# Patient Record
Sex: FEMALE | Race: BLACK OR AFRICAN AMERICAN | NOT HISPANIC OR LATINO | URBAN - METROPOLITAN AREA
[De-identification: names, ages, dates, MRNs, and addresses within clinical notes are randomized per-mention and may not be internally consistent; named-entity substitution may affect disease eponyms.]

---

## 2023-09-05 ENCOUNTER — APPOINTMENT (OUTPATIENT)
Dept: FAMILY MEDICINE | Age: 28
End: 2023-09-05

## 2024-05-02 ENCOUNTER — APPOINTMENT (OUTPATIENT)
Dept: CT IMAGING | Facility: HOSPITAL | Age: 29
End: 2024-05-02
Attending: EMERGENCY MEDICINE
Payer: COMMERCIAL

## 2024-05-02 ENCOUNTER — HOSPITAL ENCOUNTER (EMERGENCY)
Facility: HOSPITAL | Age: 29
Discharge: HOME | End: 2024-05-02
Attending: EMERGENCY MEDICINE
Payer: COMMERCIAL

## 2024-05-02 VITALS
HEART RATE: 61 BPM | BODY MASS INDEX: 29.55 KG/M2 | HEIGHT: 68 IN | SYSTOLIC BLOOD PRESSURE: 128 MMHG | TEMPERATURE: 99 F | RESPIRATION RATE: 24 BRPM | OXYGEN SATURATION: 98 % | WEIGHT: 195 LBS | DIASTOLIC BLOOD PRESSURE: 92 MMHG

## 2024-05-02 DIAGNOSIS — S09.90XA INJURY OF HEAD, INITIAL ENCOUNTER: ICD-10-CM

## 2024-05-02 DIAGNOSIS — S16.1XXA STRAIN OF NECK MUSCLE, INITIAL ENCOUNTER: Primary | ICD-10-CM
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
## 2024-05-02 PROCEDURE — 99284 EMERGENCY DEPT VISIT MOD MDM: CPT

## 2024-05-02 PROCEDURE — 70450 CT HEAD/BRAIN W/O DYE: CPT | Performed by: EMERGENCY MEDICINE

## 2024-05-02 PROCEDURE — 72125 CT NECK SPINE W/O DYE: CPT | Performed by: EMERGENCY MEDICINE

## 2024-05-02 RX ORDER — HYDROCODONE BITARTRATE AND ACETAMINOPHEN 5; 325 MG/1; MG/1
1-2 TABLET ORAL EVERY 6 HOURS PRN
Qty: 10 TABLET | Refills: 0 | Status: SHIPPED | OUTPATIENT
Start: 2024-05-02 | End: 2024-05-07

## 2024-05-02 RX ORDER — HYDROCODONE BITARTRATE AND ACETAMINOPHEN 5; 325 MG/1; MG/1
1 TABLET ORAL ONCE
Status: COMPLETED | OUTPATIENT
Start: 2024-05-02 | End: 2024-05-02

## 2024-05-02 RX ORDER — IBUPROFEN 600 MG/1
600 TABLET ORAL ONCE
Status: COMPLETED | OUTPATIENT
Start: 2024-05-02 | End: 2024-05-02
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
## 2024-05-03 NOTE — ED QUICK NOTES
Introduced self to patient. Patient lying in position of comfort in bed, C-Collar in place, on monitors, blanket provided, given call light and instructed to call if anything was needed.

## 2024-05-03 NOTE — ED QUICK NOTES
MD cleared patient for discharge. Patient provided with discharge paperwork and RN discussed plan of care. Patient given opportunity to ask any questions. MD prescribed 1 medication. Patient was in no apparent distress. Patient instructed to follow up with PCP. Patient pushed out of ED in wheelchair by family.

## 2024-12-29 ENCOUNTER — WALK IN (OUTPATIENT)
Dept: URGENT CARE | Age: 29
End: 2024-12-29

## 2024-12-29 ENCOUNTER — HOSPITAL ENCOUNTER (EMERGENCY)
Age: 29
Discharge: HOME | End: 2024-12-29
Attending: EMERGENCY MEDICINE

## 2024-12-29 ENCOUNTER — APPOINTMENT (OUTPATIENT)
Age: 29
End: 2024-12-29
Attending: EMERGENCY MEDICINE

## 2024-12-29 VITALS
RESPIRATION RATE: 18 BRPM | TEMPERATURE: 97 F | SYSTOLIC BLOOD PRESSURE: 106 MMHG | DIASTOLIC BLOOD PRESSURE: 61 MMHG | OXYGEN SATURATION: 100 % | WEIGHT: 230 LBS | HEART RATE: 58 BPM

## 2024-12-29 DIAGNOSIS — O03.4 INCOMPLETE MISCARRIAGE (CMD): Primary | ICD-10-CM

## 2024-12-29 DIAGNOSIS — R10.9 ABDOMINAL PAIN, UNSPECIFIED ABDOMINAL LOCATION: Primary | ICD-10-CM

## 2024-12-29 LAB
ABO + RH BLD: NORMAL
ANION GAP SERPL CALC-SCNC: 16 MMOL/L (ref 7–19)
BASOPHILS # BLD: 0 K/MCL (ref 0–0.3)
BASOPHILS # BLD: 0 K/MCL (ref 0–0.3)
BASOPHILS NFR BLD: 1 %
BASOPHILS NFR BLD: 1 %
BUN SERPL-MCNC: 8 MG/DL (ref 6–20)
BUN/CREAT SERPL: 11 (ref 7–25)
CALCIUM SERPL-MCNC: 8.7 MG/DL (ref 8.4–10.2)
CHLORIDE SERPL-SCNC: 102 MMOL/L (ref 97–110)
CO2 SERPL-SCNC: 23 MMOL/L (ref 21–32)
CREAT SERPL-MCNC: 0.73 MG/DL (ref 0.51–0.95)
DEPRECATED RDW RBC: 52.7 FL (ref 39–50)
DEPRECATED RDW RBC: 53.1 FL (ref 39–50)
EGFRCR SERPLBLD CKD-EPI 2021: >90 ML/MIN/{1.73_M2}
EOSINOPHIL # BLD: 0.1 K/MCL (ref 0–0.5)
EOSINOPHIL # BLD: 0.1 K/MCL (ref 0–0.5)
EOSINOPHIL NFR BLD: 2 %
EOSINOPHIL NFR BLD: 2 %
ERYTHROCYTE [DISTWIDTH] IN BLOOD: 21.2 % (ref 11–15)
ERYTHROCYTE [DISTWIDTH] IN BLOOD: 21.8 % (ref 11–15)
FASTING DURATION TIME PATIENT: NORMAL H
GLUCOSE SERPL-MCNC: 88 MG/DL (ref 70–99)
HCG SERPL-ACNC: ABNORMAL MUNITS/ML
HCT VFR BLD CALC: 28.7 % (ref 36–46.5)
HCT VFR BLD CALC: 30.4 % (ref 36–46.5)
HGB BLD-MCNC: 8.7 G/DL (ref 12–15.5)
HGB BLD-MCNC: 9.1 G/DL (ref 12–15.5)
IMM GRANULOCYTES # BLD AUTO: 0 K/MCL (ref 0–0.2)
IMM GRANULOCYTES # BLD AUTO: 0 K/MCL (ref 0–0.2)
IMM GRANULOCYTES # BLD: 0 %
IMM GRANULOCYTES # BLD: 0 %
LYMPHOCYTES # BLD: 1.4 K/MCL (ref 1–4.8)
LYMPHOCYTES # BLD: 1.7 K/MCL (ref 1–4.8)
LYMPHOCYTES NFR BLD: 26 %
LYMPHOCYTES NFR BLD: 27 %
MCH RBC QN AUTO: 20.7 PG (ref 26–34)
MCH RBC QN AUTO: 20.9 PG (ref 26–34)
MCHC RBC AUTO-ENTMCNC: 29.9 G/DL (ref 32–36.5)
MCHC RBC AUTO-ENTMCNC: 30.3 G/DL (ref 32–36.5)
MCV RBC AUTO: 69 FL (ref 78–100)
MCV RBC AUTO: 69.1 FL (ref 78–100)
MONOCYTES # BLD: 0.6 K/MCL (ref 0.3–0.9)
MONOCYTES # BLD: 0.7 K/MCL (ref 0.3–0.9)
MONOCYTES NFR BLD: 10 %
MONOCYTES NFR BLD: 11 %
NEUTROPHILS # BLD: 3.4 K/MCL (ref 1.8–7.7)
NEUTROPHILS # BLD: 3.7 K/MCL (ref 1.8–7.7)
NEUTROPHILS NFR BLD: 59 %
NEUTROPHILS NFR BLD: 61 %
NRBC BLD MANUAL-RTO: 0 /100 WBC
NRBC BLD MANUAL-RTO: 0 /100 WBC
PLATELET # BLD AUTO: 211 K/MCL (ref 140–450)
PLATELET # BLD AUTO: 224 K/MCL (ref 140–450)
POTASSIUM SERPL-SCNC: 4.1 MMOL/L (ref 3.4–5.1)
RAINBOW EXTRA TUBES HOLD SPECIMEN: NORMAL
RBC # BLD: 4.16 MIL/MCL (ref 4–5.2)
RBC # BLD: 4.4 MIL/MCL (ref 4–5.2)
SODIUM SERPL-SCNC: 137 MMOL/L (ref 135–145)
WBC # BLD: 5.5 K/MCL (ref 4.2–11)
WBC # BLD: 6.3 K/MCL (ref 4.2–11)

## 2024-12-29 PROCEDURE — 10004651 HB RX, NO CHARGE ITEM: Performed by: EMERGENCY MEDICINE

## 2024-12-29 PROCEDURE — 10002800 HB RX 250 W HCPCS

## 2024-12-29 PROCEDURE — 99285 EMERGENCY DEPT VISIT HI MDM: CPT | Performed by: EMERGENCY MEDICINE

## 2024-12-29 PROCEDURE — 10002800 HB RX 250 W HCPCS: Performed by: EMERGENCY MEDICINE

## 2024-12-29 PROCEDURE — 84702 CHORIONIC GONADOTROPIN TEST: CPT | Performed by: EMERGENCY MEDICINE

## 2024-12-29 PROCEDURE — 80048 BASIC METABOLIC PNL TOTAL CA: CPT | Performed by: EMERGENCY MEDICINE

## 2024-12-29 PROCEDURE — 85025 COMPLETE CBC W/AUTO DIFF WBC: CPT | Performed by: EMERGENCY MEDICINE

## 2024-12-29 PROCEDURE — 96374 THER/PROPH/DIAG INJ IV PUSH: CPT

## 2024-12-29 PROCEDURE — 76817 TRANSVAGINAL US OBSTETRIC: CPT

## 2024-12-29 PROCEDURE — 86900 BLOOD TYPING SEROLOGIC ABO: CPT | Performed by: EMERGENCY MEDICINE

## 2024-12-29 PROCEDURE — 96376 TX/PRO/DX INJ SAME DRUG ADON: CPT

## 2024-12-29 PROCEDURE — 96361 HYDRATE IV INFUSION ADD-ON: CPT

## 2024-12-29 PROCEDURE — 36415 COLL VENOUS BLD VENIPUNCTURE: CPT

## 2024-12-29 PROCEDURE — 10002807 HB RX 258: Performed by: EMERGENCY MEDICINE

## 2024-12-29 PROCEDURE — 99284 EMERGENCY DEPT VISIT MOD MDM: CPT

## 2024-12-29 RX ORDER — ACETAMINOPHEN 325 MG/1
650 TABLET ORAL ONCE
Status: COMPLETED | OUTPATIENT
Start: 2024-12-29 | End: 2024-12-29

## 2024-12-29 RX ADMIN — ACETAMINOPHEN 650 MG: 325 TABLET ORAL at 13:05

## 2024-12-29 RX ADMIN — HYDROMORPHONE HYDROCHLORIDE 0.5 MG: 1 INJECTION, SOLUTION INTRAMUSCULAR; INTRAVENOUS; SUBCUTANEOUS at 11:44

## 2024-12-29 RX ADMIN — HYDROMORPHONE HYDROCHLORIDE 0.5 MG: 1 INJECTION, SOLUTION INTRAMUSCULAR; INTRAVENOUS; SUBCUTANEOUS at 10:35

## 2024-12-29 RX ADMIN — SODIUM CHLORIDE, POTASSIUM CHLORIDE, SODIUM LACTATE AND CALCIUM CHLORIDE 1000 ML: 600; 310; 30; 20 INJECTION, SOLUTION INTRAVENOUS at 14:01

## 2024-12-29 ASSESSMENT — PAIN SCALES - GENERAL
PAINLEVEL_OUTOF10: 5
PAINLEVEL_OUTOF10: 10
PAINLEVEL_OUTOF10: 9
PAINLEVEL_OUTOF10: 7

## 2024-12-30 ENCOUNTER — NURSE ONLY (OUTPATIENT)
Dept: OBGYN | Age: 29
End: 2024-12-30
Attending: EMERGENCY MEDICINE

## 2024-12-30 ENCOUNTER — TELEPHONE (OUTPATIENT)
Dept: OBGYN | Age: 29
End: 2024-12-30

## 2024-12-30 DIAGNOSIS — O03.4 INCOMPLETE MISCARRIAGE (CMD): Primary | ICD-10-CM

## 2025-01-07 ENCOUNTER — HOSPITAL ENCOUNTER (EMERGENCY)
Age: 30
Discharge: HOME | End: 2025-01-08
Attending: EMERGENCY MEDICINE

## 2025-01-07 DIAGNOSIS — D64.9 ANEMIA, UNSPECIFIED TYPE: ICD-10-CM

## 2025-01-07 DIAGNOSIS — A08.4 VIRAL GASTROENTERITIS: ICD-10-CM

## 2025-01-07 DIAGNOSIS — O03.9 MISCARRIAGE (CMD): ICD-10-CM

## 2025-01-07 DIAGNOSIS — N93.9 VAGINAL BLEEDING: Primary | ICD-10-CM

## 2025-01-07 LAB
ALBUMIN SERPL-MCNC: 3.5 G/DL (ref 3.4–5)
ALBUMIN/GLOB SERPL: 0.8 {RATIO} (ref 1–2.4)
ALP SERPL-CCNC: 87 UNITS/L (ref 45–117)
ALT SERPL-CCNC: 24 UNITS/L
AMORPH SED URNS QL MICRO: PRESENT
ANION GAP SERPL CALC-SCNC: 15 MMOL/L (ref 7–19)
APPEARANCE UR: ABNORMAL
AST SERPL-CCNC: 26 UNITS/L
B-HCG UR QL: POSITIVE
BACTERIA #/AREA URNS HPF: ABNORMAL /HPF
BASOPHILS # BLD: 0 K/MCL (ref 0–0.3)
BASOPHILS NFR BLD: 0 %
BILIRUB SERPL-MCNC: 0.2 MG/DL (ref 0.2–1)
BILIRUB UR QL STRIP: NEGATIVE
BUN SERPL-MCNC: 8 MG/DL (ref 6–20)
BUN/CREAT SERPL: 10 (ref 7–25)
CALCIUM SERPL-MCNC: 8.9 MG/DL (ref 8.4–10.2)
CHLORIDE SERPL-SCNC: 104 MMOL/L (ref 97–110)
CO2 SERPL-SCNC: 24 MMOL/L (ref 21–32)
COLOR UR: YELLOW
CREAT SERPL-MCNC: 0.81 MG/DL (ref 0.51–0.95)
DEPRECATED RDW RBC: 53.5 FL (ref 39–50)
EGFRCR SERPLBLD CKD-EPI 2021: >90 ML/MIN/{1.73_M2}
EOSINOPHIL # BLD: 0.1 K/MCL (ref 0–0.5)
EOSINOPHIL NFR BLD: 2 %
ERYTHROCYTE [DISTWIDTH] IN BLOOD: 21.9 % (ref 11–15)
FASTING DURATION TIME PATIENT: ABNORMAL H
GLOBULIN SER-MCNC: 4.3 G/DL (ref 2–4)
GLUCOSE SERPL-MCNC: 100 MG/DL (ref 70–99)
GLUCOSE UR STRIP-MCNC: NEGATIVE MG/DL
HCT VFR BLD CALC: 29.9 % (ref 36–46.5)
HGB BLD-MCNC: 8.9 G/DL (ref 12–15.5)
HGB UR QL STRIP: ABNORMAL
HYALINE CASTS #/AREA URNS LPF: ABNORMAL /LPF
IMM GRANULOCYTES # BLD AUTO: 0 K/MCL (ref 0–0.2)
IMM GRANULOCYTES # BLD: 0 %
KETONES UR STRIP-MCNC: NEGATIVE MG/DL
LEUKOCYTE ESTERASE UR QL STRIP: ABNORMAL
LYMPHOCYTES # BLD: 1.3 K/MCL (ref 1–4.8)
LYMPHOCYTES NFR BLD: 26 %
MAGNESIUM SERPL-MCNC: 1.9 MG/DL (ref 1.7–2.4)
MCH RBC QN AUTO: 20.6 PG (ref 26–34)
MCHC RBC AUTO-ENTMCNC: 29.8 G/DL (ref 32–36.5)
MCV RBC AUTO: 69.4 FL (ref 78–100)
MONOCYTES # BLD: 0.5 K/MCL (ref 0.3–0.9)
MONOCYTES NFR BLD: 10 %
MUCOUS THREADS URNS QL MICRO: PRESENT
NEUTROPHILS # BLD: 3 K/MCL (ref 1.8–7.7)
NEUTROPHILS NFR BLD: 62 %
NITRITE UR QL STRIP: NEGATIVE
NRBC BLD MANUAL-RTO: 0 /100 WBC
PH UR STRIP: 7 [PH] (ref 5–7)
PLATELET # BLD AUTO: 246 K/MCL (ref 140–450)
POTASSIUM SERPL-SCNC: 4.3 MMOL/L (ref 3.4–5.1)
PROT SERPL-MCNC: 7.8 G/DL (ref 6.4–8.2)
PROT UR STRIP-MCNC: ABNORMAL MG/DL
RBC # BLD: 4.31 MIL/MCL (ref 4–5.2)
RBC #/AREA URNS HPF: ABNORMAL /HPF
SODIUM SERPL-SCNC: 139 MMOL/L (ref 135–145)
SP GR UR STRIP: 1.02 (ref 1–1.03)
SQUAMOUS #/AREA URNS HPF: ABNORMAL /HPF
UROBILINOGEN UR STRIP-MCNC: 0.2 MG/DL
WBC # BLD: 5 K/MCL (ref 4.2–11)
WBC #/AREA URNS HPF: ABNORMAL /HPF

## 2025-01-07 PROCEDURE — 81001 URINALYSIS AUTO W/SCOPE: CPT | Performed by: EMERGENCY MEDICINE

## 2025-01-07 PROCEDURE — 99284 EMERGENCY DEPT VISIT MOD MDM: CPT

## 2025-01-07 PROCEDURE — 36415 COLL VENOUS BLD VENIPUNCTURE: CPT

## 2025-01-07 PROCEDURE — 83735 ASSAY OF MAGNESIUM: CPT | Performed by: EMERGENCY MEDICINE

## 2025-01-07 PROCEDURE — 81025 URINE PREGNANCY TEST: CPT | Performed by: EMERGENCY MEDICINE

## 2025-01-07 PROCEDURE — 10002800 HB RX 250 W HCPCS: Performed by: EMERGENCY MEDICINE

## 2025-01-07 PROCEDURE — 10004651 HB RX, NO CHARGE ITEM: Performed by: EMERGENCY MEDICINE

## 2025-01-07 PROCEDURE — 96374 THER/PROPH/DIAG INJ IV PUSH: CPT

## 2025-01-07 PROCEDURE — 85025 COMPLETE CBC W/AUTO DIFF WBC: CPT | Performed by: EMERGENCY MEDICINE

## 2025-01-07 PROCEDURE — 10002807 HB RX 258: Performed by: EMERGENCY MEDICINE

## 2025-01-07 PROCEDURE — 96361 HYDRATE IV INFUSION ADD-ON: CPT

## 2025-01-07 PROCEDURE — 84702 CHORIONIC GONADOTROPIN TEST: CPT | Performed by: CLINICAL MEDICAL LABORATORY

## 2025-01-07 PROCEDURE — 80053 COMPREHEN METABOLIC PANEL: CPT | Performed by: EMERGENCY MEDICINE

## 2025-01-07 PROCEDURE — 87086 URINE CULTURE/COLONY COUNT: CPT | Performed by: CLINICAL MEDICAL LABORATORY

## 2025-01-07 RX ORDER — ONDANSETRON 2 MG/ML
4 INJECTION INTRAMUSCULAR; INTRAVENOUS ONCE
Status: COMPLETED | OUTPATIENT
Start: 2025-01-07 | End: 2025-01-07

## 2025-01-07 RX ORDER — KETOROLAC TROMETHAMINE 30 MG/ML
30 INJECTION, SOLUTION INTRAMUSCULAR; INTRAVENOUS ONCE
Status: DISCONTINUED | OUTPATIENT
Start: 2025-01-07 | End: 2025-01-08

## 2025-01-07 RX ORDER — ACETAMINOPHEN 500 MG
1000 TABLET ORAL ONCE
Status: COMPLETED | OUTPATIENT
Start: 2025-01-07 | End: 2025-01-07

## 2025-01-07 RX ADMIN — ONDANSETRON 4 MG: 2 INJECTION INTRAMUSCULAR; INTRAVENOUS at 21:51

## 2025-01-07 RX ADMIN — ACETAMINOPHEN 1000 MG: 500 TABLET ORAL at 22:45

## 2025-01-07 RX ADMIN — SODIUM CHLORIDE 1000 ML: 9 INJECTION, SOLUTION INTRAVENOUS at 21:50

## 2025-01-07 ASSESSMENT — PAIN SCALES - GENERAL: PAINLEVEL_OUTOF10: 8

## 2025-01-08 ENCOUNTER — APPOINTMENT (OUTPATIENT)
Age: 30
End: 2025-01-08
Attending: EMERGENCY MEDICINE

## 2025-01-08 VITALS
HEART RATE: 58 BPM | BODY MASS INDEX: 33.96 KG/M2 | DIASTOLIC BLOOD PRESSURE: 95 MMHG | OXYGEN SATURATION: 98 % | TEMPERATURE: 98.1 F | WEIGHT: 224.1 LBS | HEIGHT: 68 IN | SYSTOLIC BLOOD PRESSURE: 161 MMHG | RESPIRATION RATE: 18 BRPM

## 2025-01-08 LAB — HCG SERPL-ACNC: 151 MUNITS/ML

## 2025-01-08 PROCEDURE — 76815 OB US LIMITED FETUS(S): CPT | Performed by: STUDENT IN AN ORGANIZED HEALTH CARE EDUCATION/TRAINING PROGRAM

## 2025-01-08 PROCEDURE — 76817 TRANSVAGINAL US OBSTETRIC: CPT | Performed by: STUDENT IN AN ORGANIZED HEALTH CARE EDUCATION/TRAINING PROGRAM

## 2025-01-08 PROCEDURE — 76817 TRANSVAGINAL US OBSTETRIC: CPT

## 2025-01-08 RX ORDER — HYDROCODONE BITARTRATE AND ACETAMINOPHEN 5; 325 MG/1; MG/1
1 TABLET ORAL EVERY 6 HOURS PRN
Qty: 6 TABLET | Refills: 0 | Status: SHIPPED | OUTPATIENT
Start: 2025-01-08

## 2025-01-08 RX ORDER — PROCHLORPERAZINE 25 MG
25 SUPPOSITORY, RECTAL RECTAL EVERY 12 HOURS PRN
Qty: 6 SUPPOSITORY | Refills: 0 | Status: SHIPPED | OUTPATIENT
Start: 2025-01-08

## 2025-01-08 RX ORDER — ONDANSETRON 4 MG/1
4 TABLET, ORALLY DISINTEGRATING ORAL EVERY 6 HOURS PRN
Qty: 10 TABLET | Refills: 0 | Status: SHIPPED | OUTPATIENT
Start: 2025-01-08

## 2025-01-08 ASSESSMENT — ENCOUNTER SYMPTOMS
VOMITING: 1
COUGH: 0
FATIGUE: 1
SEIZURES: 0
AGITATION: 0
LIGHT-HEADEDNESS: 1
WEAKNESS: 1
DIARRHEA: 1
NAUSEA: 1
CONFUSION: 0
DIZZINESS: 1
SHORTNESS OF BREATH: 0

## 2025-01-09 LAB — BACTERIA UR CULT: NORMAL

## 2025-01-14 ENCOUNTER — APPOINTMENT (OUTPATIENT)
Dept: ULTRASOUND IMAGING | Age: 30
End: 2025-01-14

## 2025-01-14 ENCOUNTER — APPOINTMENT (OUTPATIENT)
Dept: OBGYN | Age: 30
End: 2025-01-14

## 2025-02-10 ENCOUNTER — APPOINTMENT (OUTPATIENT)
Dept: LAB | Age: 30
End: 2025-02-10

## 2025-02-10 ENCOUNTER — APPOINTMENT (OUTPATIENT)
Dept: OBGYN | Age: 30
End: 2025-02-10

## 2025-02-13 ENCOUNTER — APPOINTMENT (OUTPATIENT)
Dept: OBGYN | Age: 30
End: 2025-02-13